# Patient Record
Sex: MALE | Race: BLACK OR AFRICAN AMERICAN | NOT HISPANIC OR LATINO | URBAN - METROPOLITAN AREA
[De-identification: names, ages, dates, MRNs, and addresses within clinical notes are randomized per-mention and may not be internally consistent; named-entity substitution may affect disease eponyms.]

---

## 2021-06-09 ENCOUNTER — INPATIENT (INPATIENT)
Facility: HOSPITAL | Age: 76
LOS: 0 days | Discharge: HOME | DRG: 287 | End: 2021-06-09
Attending: INTERNAL MEDICINE | Admitting: INTERNAL MEDICINE
Payer: MEDICARE

## 2021-06-09 VITALS
DIASTOLIC BLOOD PRESSURE: 70 MMHG | TEMPERATURE: 98 F | HEART RATE: 68 BPM | WEIGHT: 134.92 LBS | SYSTOLIC BLOOD PRESSURE: 156 MMHG | HEIGHT: 66 IN | RESPIRATION RATE: 18 BRPM | OXYGEN SATURATION: 96 %

## 2021-06-09 VITALS — WEIGHT: 134.92 LBS | HEIGHT: 65.98 IN

## 2021-06-09 DIAGNOSIS — E11.9 TYPE 2 DIABETES MELLITUS WITHOUT COMPLICATIONS: ICD-10-CM

## 2021-06-09 DIAGNOSIS — I20.0 UNSTABLE ANGINA: ICD-10-CM

## 2021-06-09 DIAGNOSIS — I10 ESSENTIAL (PRIMARY) HYPERTENSION: ICD-10-CM

## 2021-06-09 LAB
A1C WITH ESTIMATED AVERAGE GLUCOSE RESULT: 6.7 % — HIGH (ref 4–5.6)
ALBUMIN SERPL ELPH-MCNC: 4.8 G/DL — SIGNIFICANT CHANGE UP (ref 3.3–5)
ALP SERPL-CCNC: 65 U/L — SIGNIFICANT CHANGE UP (ref 40–120)
ALT FLD-CCNC: 21 U/L — SIGNIFICANT CHANGE UP (ref 10–45)
ANION GAP SERPL CALC-SCNC: 13 MMOL/L — SIGNIFICANT CHANGE UP (ref 5–17)
APTT BLD: 29.5 SEC — SIGNIFICANT CHANGE UP (ref 27.5–35.5)
AST SERPL-CCNC: 20 U/L — SIGNIFICANT CHANGE UP (ref 10–40)
BASOPHILS # BLD AUTO: 0.08 K/UL — SIGNIFICANT CHANGE UP (ref 0–0.2)
BASOPHILS NFR BLD AUTO: 1.3 % — SIGNIFICANT CHANGE UP (ref 0–2)
BILIRUB SERPL-MCNC: 0.4 MG/DL — SIGNIFICANT CHANGE UP (ref 0.2–1.2)
BUN SERPL-MCNC: 18 MG/DL — SIGNIFICANT CHANGE UP (ref 7–23)
CALCIUM SERPL-MCNC: 10.2 MG/DL — SIGNIFICANT CHANGE UP (ref 8.4–10.5)
CHLORIDE SERPL-SCNC: 103 MMOL/L — SIGNIFICANT CHANGE UP (ref 96–108)
CO2 SERPL-SCNC: 28 MMOL/L — SIGNIFICANT CHANGE UP (ref 22–31)
CREAT SERPL-MCNC: 1.02 MG/DL — SIGNIFICANT CHANGE UP (ref 0.5–1.3)
EOSINOPHIL # BLD AUTO: 0.18 K/UL — SIGNIFICANT CHANGE UP (ref 0–0.5)
EOSINOPHIL NFR BLD AUTO: 3 % — SIGNIFICANT CHANGE UP (ref 0–6)
ESTIMATED AVERAGE GLUCOSE: 146 MG/DL — HIGH (ref 68–114)
GLUCOSE BLDC GLUCOMTR-MCNC: 110 MG/DL — HIGH (ref 70–99)
GLUCOSE BLDC GLUCOMTR-MCNC: 123 MG/DL — HIGH (ref 70–99)
GLUCOSE SERPL-MCNC: 118 MG/DL — HIGH (ref 70–99)
HCT VFR BLD CALC: 43.2 % — SIGNIFICANT CHANGE UP (ref 39–50)
HGB BLD-MCNC: 13.7 G/DL — SIGNIFICANT CHANGE UP (ref 13–17)
IMM GRANULOCYTES NFR BLD AUTO: 0.2 % — SIGNIFICANT CHANGE UP (ref 0–1.5)
INR BLD: 1.06 — SIGNIFICANT CHANGE UP (ref 0.88–1.16)
LYMPHOCYTES # BLD AUTO: 3.34 K/UL — HIGH (ref 1–3.3)
LYMPHOCYTES # BLD AUTO: 55.4 % — HIGH (ref 13–44)
MAGNESIUM SERPL-MCNC: 2 MG/DL — SIGNIFICANT CHANGE UP (ref 1.6–2.6)
MCHC RBC-ENTMCNC: 26.4 PG — LOW (ref 27–34)
MCHC RBC-ENTMCNC: 31.7 GM/DL — LOW (ref 32–36)
MCV RBC AUTO: 83.2 FL — SIGNIFICANT CHANGE UP (ref 80–100)
MONOCYTES # BLD AUTO: 0.53 K/UL — SIGNIFICANT CHANGE UP (ref 0–0.9)
MONOCYTES NFR BLD AUTO: 8.8 % — SIGNIFICANT CHANGE UP (ref 2–14)
NEUTROPHILS # BLD AUTO: 1.89 K/UL — SIGNIFICANT CHANGE UP (ref 1.8–7.4)
NEUTROPHILS NFR BLD AUTO: 31.3 % — LOW (ref 43–77)
NRBC # BLD: 0 /100 WBCS — SIGNIFICANT CHANGE UP (ref 0–0)
NT-PROBNP SERPL-SCNC: 87 PG/ML — SIGNIFICANT CHANGE UP (ref 0–300)
PLATELET # BLD AUTO: 264 K/UL — SIGNIFICANT CHANGE UP (ref 150–400)
POTASSIUM SERPL-MCNC: 4 MMOL/L — SIGNIFICANT CHANGE UP (ref 3.5–5.3)
POTASSIUM SERPL-SCNC: 4 MMOL/L — SIGNIFICANT CHANGE UP (ref 3.5–5.3)
PROT SERPL-MCNC: 8.4 G/DL — HIGH (ref 6–8.3)
PROTHROM AB SERPL-ACNC: 12.7 SEC — SIGNIFICANT CHANGE UP (ref 10.6–13.6)
RBC # BLD: 5.19 M/UL — SIGNIFICANT CHANGE UP (ref 4.2–5.8)
RBC # FLD: 13.2 % — SIGNIFICANT CHANGE UP (ref 10.3–14.5)
SARS-COV-2 RNA SPEC QL NAA+PROBE: NEGATIVE — SIGNIFICANT CHANGE UP
SODIUM SERPL-SCNC: 144 MMOL/L — SIGNIFICANT CHANGE UP (ref 135–145)
TROPONIN T SERPL-MCNC: <0.01 NG/ML — SIGNIFICANT CHANGE UP (ref 0–0.01)
WBC # BLD: 6.03 K/UL — SIGNIFICANT CHANGE UP (ref 3.8–10.5)
WBC # FLD AUTO: 6.03 K/UL — SIGNIFICANT CHANGE UP (ref 3.8–10.5)

## 2021-06-09 PROCEDURE — 36415 COLL VENOUS BLD VENIPUNCTURE: CPT

## 2021-06-09 PROCEDURE — 83735 ASSAY OF MAGNESIUM: CPT

## 2021-06-09 PROCEDURE — 99152 MOD SED SAME PHYS/QHP 5/>YRS: CPT

## 2021-06-09 PROCEDURE — 85610 PROTHROMBIN TIME: CPT

## 2021-06-09 PROCEDURE — C1769: CPT

## 2021-06-09 PROCEDURE — C1894: CPT

## 2021-06-09 PROCEDURE — 84484 ASSAY OF TROPONIN QUANT: CPT

## 2021-06-09 PROCEDURE — 82962 GLUCOSE BLOOD TEST: CPT

## 2021-06-09 PROCEDURE — C1887: CPT

## 2021-06-09 PROCEDURE — 71045 X-RAY EXAM CHEST 1 VIEW: CPT | Mod: 26

## 2021-06-09 PROCEDURE — 85730 THROMBOPLASTIN TIME PARTIAL: CPT

## 2021-06-09 PROCEDURE — 93005 ELECTROCARDIOGRAM TRACING: CPT

## 2021-06-09 PROCEDURE — 71045 X-RAY EXAM CHEST 1 VIEW: CPT

## 2021-06-09 PROCEDURE — 83036 HEMOGLOBIN GLYCOSYLATED A1C: CPT

## 2021-06-09 PROCEDURE — 85025 COMPLETE CBC W/AUTO DIFF WBC: CPT

## 2021-06-09 PROCEDURE — 93010 ELECTROCARDIOGRAM REPORT: CPT

## 2021-06-09 PROCEDURE — 93458 L HRT ARTERY/VENTRICLE ANGIO: CPT | Mod: 26

## 2021-06-09 PROCEDURE — 80053 COMPREHEN METABOLIC PANEL: CPT

## 2021-06-09 PROCEDURE — 99285 EMERGENCY DEPT VISIT HI MDM: CPT | Mod: 25

## 2021-06-09 PROCEDURE — 87635 SARS-COV-2 COVID-19 AMP PRB: CPT

## 2021-06-09 PROCEDURE — 99285 EMERGENCY DEPT VISIT HI MDM: CPT | Mod: CS

## 2021-06-09 PROCEDURE — 83880 ASSAY OF NATRIURETIC PEPTIDE: CPT

## 2021-06-09 RX ORDER — VALSARTAN 80 MG/1
80 TABLET ORAL EVERY 24 HOURS
Refills: 0 | Status: DISCONTINUED | OUTPATIENT
Start: 2021-06-10 | End: 2021-06-09

## 2021-06-09 RX ORDER — DEXTROSE 50 % IN WATER 50 %
25 SYRINGE (ML) INTRAVENOUS ONCE
Refills: 0 | Status: DISCONTINUED | OUTPATIENT
Start: 2021-06-09 | End: 2021-06-09

## 2021-06-09 RX ORDER — ATENOLOL 25 MG/1
1 TABLET ORAL
Qty: 0 | Refills: 0 | DISCHARGE

## 2021-06-09 RX ORDER — INSULIN LISPRO 100/ML
VIAL (ML) SUBCUTANEOUS
Refills: 0 | Status: DISCONTINUED | OUTPATIENT
Start: 2021-06-09 | End: 2021-06-09

## 2021-06-09 RX ORDER — AMLODIPINE BESYLATE 2.5 MG/1
5 TABLET ORAL EVERY 24 HOURS
Refills: 0 | Status: DISCONTINUED | OUTPATIENT
Start: 2021-06-10 | End: 2021-06-09

## 2021-06-09 RX ORDER — CLOPIDOGREL BISULFATE 75 MG/1
600 TABLET, FILM COATED ORAL ONCE
Refills: 0 | Status: COMPLETED | OUTPATIENT
Start: 2021-06-09 | End: 2021-06-09

## 2021-06-09 RX ORDER — CHLORTHALIDONE 50 MG
0.5 TABLET ORAL
Qty: 0 | Refills: 0 | DISCHARGE

## 2021-06-09 RX ORDER — ATENOLOL 25 MG/1
0.5 TABLET ORAL
Qty: 0 | Refills: 0 | DISCHARGE

## 2021-06-09 RX ORDER — SODIUM CHLORIDE 9 MG/ML
1000 INJECTION INTRAMUSCULAR; INTRAVENOUS; SUBCUTANEOUS
Refills: 0 | Status: DISCONTINUED | OUTPATIENT
Start: 2021-06-09 | End: 2021-06-09

## 2021-06-09 RX ORDER — DEXTROSE 50 % IN WATER 50 %
12.5 SYRINGE (ML) INTRAVENOUS ONCE
Refills: 0 | Status: DISCONTINUED | OUTPATIENT
Start: 2021-06-09 | End: 2021-06-09

## 2021-06-09 RX ORDER — DEXTROSE 50 % IN WATER 50 %
15 SYRINGE (ML) INTRAVENOUS ONCE
Refills: 0 | Status: DISCONTINUED | OUTPATIENT
Start: 2021-06-09 | End: 2021-06-09

## 2021-06-09 RX ORDER — GLUCAGON INJECTION, SOLUTION 0.5 MG/.1ML
1 INJECTION, SOLUTION SUBCUTANEOUS ONCE
Refills: 0 | Status: DISCONTINUED | OUTPATIENT
Start: 2021-06-09 | End: 2021-06-09

## 2021-06-09 RX ORDER — AMLODIPINE AND VALSARTAN 5; 320 MG/1; MG/1
0.5 TABLET, FILM COATED ORAL
Qty: 0 | Refills: 0 | DISCHARGE

## 2021-06-09 RX ORDER — SODIUM CHLORIDE 9 MG/ML
1000 INJECTION, SOLUTION INTRAVENOUS
Refills: 0 | Status: DISCONTINUED | OUTPATIENT
Start: 2021-06-09 | End: 2021-06-09

## 2021-06-09 RX ORDER — ASPIRIN/CALCIUM CARB/MAGNESIUM 324 MG
324 TABLET ORAL ONCE
Refills: 0 | Status: COMPLETED | OUTPATIENT
Start: 2021-06-09 | End: 2021-06-09

## 2021-06-09 RX ORDER — ATENOLOL 25 MG/1
50 TABLET ORAL DAILY
Refills: 0 | Status: DISCONTINUED | OUTPATIENT
Start: 2021-06-09 | End: 2021-06-09

## 2021-06-09 RX ORDER — CHLORTHALIDONE 50 MG
1 TABLET ORAL
Qty: 0 | Refills: 0 | DISCHARGE

## 2021-06-09 RX ORDER — METFORMIN HYDROCHLORIDE 850 MG/1
1 TABLET ORAL
Qty: 0 | Refills: 0 | DISCHARGE

## 2021-06-09 RX ORDER — SODIUM CHLORIDE 9 MG/ML
500 INJECTION INTRAMUSCULAR; INTRAVENOUS; SUBCUTANEOUS
Refills: 0 | Status: DISCONTINUED | OUTPATIENT
Start: 2021-06-09 | End: 2021-06-09

## 2021-06-09 RX ORDER — AMLODIPINE AND VALSARTAN 5; 320 MG/1; MG/1
0 TABLET, FILM COATED ORAL
Qty: 0 | Refills: 0 | DISCHARGE

## 2021-06-09 RX ADMIN — Medication 324 MILLIGRAM(S): at 07:58

## 2021-06-09 RX ADMIN — CLOPIDOGREL BISULFATE 600 MILLIGRAM(S): 75 TABLET, FILM COATED ORAL at 07:57

## 2021-06-09 NOTE — H&P ADULT - HISTORY OF PRESENT ILLNESS
76 y/o male w/ PMHx HTN and DM who presented to [facility] ED c/o recent intermittent, substernal chest pain independent of activity. Patient reportedly had a recent outpatient stress test w/ his outpatient cardiologist, Dr. Chavarria, that was abnormal and was told to come to the ED for a cardiac catheterization. Patient denied any dizziness, syncope, SOB/KIM, abdominal pain, nausea/vomiting, melena, LE edema, fever/chills/cough, and known sick contacts. Of note, patient has lalready received two doses of the COVID-19 Pfizer vaccine.     In ED, VS initially showed temp 97.9 F, HR 68 bpm, /70, 18 RR, and SPO2 96% on room air. EKG showed __________. Labs on arrival significant for trop negative x 1, BNP normal, and COVID-PCR negative. CXR clear and unremarkable. Patient remained chest pain free while in ED. Patient was given Aspirin 324 mg PO once and Plavix 600 mg PO once. Patient now admitted to cardiology/telemetry for chest pain w/ plan for cardiac catheterization w/ Dr. Chavarria.  74 y/o male w/ PMHx HTN and DM who presented to [facility] ED c/o recent intermittent, substernal chest pain independent of activity and non-radiating. Patient reportedly had a recent outpatient stress test w/ his outpatient cardiologist, Dr. Chavarria, that was abnormal and was told to come to the ED for a cardiac catheterization. Patient denied any dizziness, syncope, SOB/KIM, abdominal pain, nausea/vomiting, melena, LE edema, fever/chills/cough, and known sick contacts. Of note, patient has lalready received two doses of the COVID-19 Pfizer vaccine (last dose 04/2021).     In ED, VS initially showed temp 97.9 F, HR 68 bpm, /70, 18 RR, and SPO2 96% on room air. EKG showed NSR at 62 bpm w/o acute ischemic changes. Labs on arrival significant for trop negative x 1, BNP normal, and COVID-PCR negative. CXR clear and unremarkable. Patient remained chest pain free while in ED. Patient was given Aspirin 324 mg PO once and Plavix 600 mg PO once. Patient now admitted to cardiology/telemetry for chest pain w/ plan for cardiac catheterization w/ Dr. Chavarria.

## 2021-06-09 NOTE — DISCHARGE NOTE NURSING/CASE MANAGEMENT/SOCIAL WORK - PATIENT PORTAL LINK FT
You can access the FollowMyHealth Patient Portal offered by [facility] by registering at the following website: [email or web address]. By joining [employer]’s FollowMyHealth portal, you will also be able to view your health information using other applications (apps) compatible with our system.

## 2021-06-09 NOTE — DISCHARGE NOTE PROVIDER - NSDCCPCAREPLAN_GEN_ALL_CORE_FT
PRINCIPAL DISCHARGE DIAGNOSIS  Diagnosis: Chest pain  Assessment and Plan of Treatment: You presented to the hospital with complaints of chest pain and subsequently had a cardiac catheterization procedure done to rule out coronary artery disease. It was discovered that you have no blockages in your heart. Chest pain can be explained by a number of reasons including stress, acid reflux, and other sources. Please continue to control your risk factors, take your medications as they are prescribed, and follow up with your outpatient providers for continued management.      SECONDARY DISCHARGE DIAGNOSES  Diagnosis: Hypertension  Assessment and Plan of Treatment: Please continue your home medications to control your blood pressure as they were previously prescribed to you.    Diagnosis: Type II diabetes mellitus  Assessment and Plan of Treatment: Please do not take your home medication of Metformin for the next two days. You may restart this medication as it was previoulsy prescribed to you on 06/12/2021.

## 2021-06-09 NOTE — ED PROVIDER NOTE - PROGRESS NOTE DETAILS
Dr. Chavarria, patient's cardiologist, came to the ED, patient is already on cath lab schedule today for cardiac catheterization.

## 2021-06-09 NOTE — ED ADULT TRIAGE NOTE - SOURCE OF INFORMATION
Patient is resting in bed, awake and quiet. Room air. Side rails are up x2. Fall precautions are in place. Bed is in lowest position. Call light is in reach. Patient denies needs at present. Will continue to monitor patient per unit protocols.  Electronically signed by Adam Tesfaye RN on 2/20/2020 at 4:46 PM Patient

## 2021-06-09 NOTE — ED ADULT NURSE NOTE - OBJECTIVE STATEMENT
75y Male A&OX3 c/o intermittent Midsternal chest pain sine Monday. Pt in to the ed today "because my cardiologist tole me to come in to the ed to get checked out". Reports seeing a cardiologist for hypertension. Denies sob, fever, chills, n/v, cough, night sweats, nor past hx of cardiac procedure, nor stents, nor blood clots". Pt received 2 doses of Pfizer covid voax

## 2021-06-09 NOTE — ED PROVIDER NOTE - CLINICAL SUMMARY MEDICAL DECISION MAKING FREE TEXT BOX
76 y/o M PMHx HTN, NIDDM presents today with intermittent chest pain for 3 days. Patient reports having an abnormal outpatient stress exam a few days ago with his cardiologist Dr. Chavarria. Patient denies any active chest pain. Patient is afebrile, vitals are normal, EKG shows normal sinus rhythm and non-specific ST/T changes, CXR shows no acute disease. Labs have been noted/troponin normal. Patient has been given aspirin and Plavix and has been admitted to cardiology for cardiac catheterization. 76 y/o M PMHx HTN, NIDDM presents today with intermittent chest pain for 3 days. Patient reports having an abnormal outpatient stress exam a few days ago with his cardiologist Dr. Chavarria. Patient denies any active chest pain. Patient is afebrile, vitals are normal, EKG shows normal sinus rhythm and non-specific ST/T changes, CXR shows no acute disease. Labs have been noted/troponin normal. Patient given aspirin and Plavix load and admitted to cardiology for cardiac catheterization.

## 2021-06-09 NOTE — H&P ADULT - PROBLEM SELECTOR PLAN 3
Home med: Metformin 500 mg BID  - holding home oral meds   - ISS ordered   - A1c/Lipid panel in AM if patient remains admitted overnight         VTE PPX: holding for procedure   Dispo: pending cardiac cath

## 2021-06-09 NOTE — ED PROVIDER NOTE - OBJECTIVE STATEMENT
74 y/o M PMHx HTN, NIDDM presents today with intermittent chest pain ongoing for 3 days. Patient notes uncomfortable feeling in center of chest, non-radiating, with no exacerbating or alleviating factors. Patient reports that he went to see his cardiologist, Dr. Chavarria, this week where he had an abnormal treadmill stress test. Patient was sent to the ED today by Dr. Chavarria for cardiac catheterization. Upon arrival he denies chest pain, SOB, fevers, nausea, chills, vomiting, diarrhea, hx of CAD, hx of MI. Patient is fully vaccinated for COVID as of April and denies any hx of COVID infection 74 y/o M PMHx HTN, NIDDM presents today with intermittent chest pain ongoing for 3 days. Patient notes uncomfortable feeling in center of chest, non-radiating, with no exacerbating or alleviating factors. Patient reports that he went to see his cardiologist, Dr. Chavarria, this week where he had an abnormal treadmill stress test. Patient was sent to the ED today by Dr. Chavarria for cardiac catheterization. Upon arrival he denies chest pain, SOB, fevers, nausea, chills, vomiting, diarrhea, hx of CAD, hx of MI. Patient is fully vaccinated for COVID  19 as of April and denies any hx of COVID infection.

## 2021-06-09 NOTE — H&P ADULT - PROBLEM SELECTOR PLAN 1
Presented w/ non-radiating chest pain, Presented w/ non-radiating, substernal chest pain independent of exertion   - reportedly had an abnormal outpatient stress test   - trop negative x 1 in ED   - EKG nonischemic on arrival   - given Aspirin 324 mg PO once and Plavix 600 mg PO once in ED   - consent obtained for cardiac catheterization w/ Dr. Chavarria on 06/09/2021   - cath lab ordered placed  - f/u cardiac cath results     ASA: III        Mallampati: III             CCS: III    Sedation Plan: Moderate   Patient Is Suitable Candidate For Sedation: Yes      Risks & benefits of procedure and sedation and risks and benefits for the alternative therapy have been explained to the patient in layman’s terms including but not limited to: allergic reaction, bleeding, infection, arrhythmia, respiratory compromise, renal and vascular compromise, limb damage, MI, CVA, emergent CABG/Vascular Surgery and death. Informed consent obtained and in chart.

## 2021-06-09 NOTE — DISCHARGE NOTE PROVIDER - CARE PROVIDER_API CALL
Juvenal Chavarria)  Cardiovascular Disease; Interventional Cardiology  [address] [place] [address]  Phone: [phone]  Fax: [phone]  Follow Up Time: 2 weeks

## 2021-06-09 NOTE — ED ADULT NURSE NOTE - NSIMPLEMENTINTERV_GEN_ALL_ED
Implemented All Universal Safety Interventions:  [place] to call system. Call bell, personal items and telephone within reach. Instruct patient to call for assistance. Room bathroom lighting operational. Non-slip footwear when patient is off stretcher. Physically safe environment: no spills, clutter or unnecessary equipment. Stretcher in lowest position, wheels locked, appropriate side rails in place.

## 2021-06-09 NOTE — DISCHARGE NOTE PROVIDER - HOSPITAL COURSE
76 y/o male w/ PMHx HTN and DM who presented to [facility] ED c/o recent intermittent, substernal chest pain independent of activity and non-radiating. Patient reportedly had a recent outpatient stress test w/ his outpatient cardiologist, Dr. Chavarria, that was abnormal and was told to come to the ED for a cardiac catheterization. Patient denied any dizziness, syncope, SOB/KIM, abdominal pain, nausea/vomiting, melena, LE edema, fever/chills/cough, and known sick contacts. Of note, patient has lalready received two doses of the COVID-19 Pfizer vaccine (last dose 04/2021). In ED, VS initially showed temp 97.9 F, HR 68 bpm, /70, 18 RR, and SPO2 96% on room air. EKG showed NSR at 62 bpm w/o acute ischemic changes. Labs on arrival significant for trop negative x 1, BNP normal, and COVID-PCR negative. CXR clear and unremarkable. Patient remained chest pain free while in ED. Patient was given Aspirin 324 mg PO once and Plavix 600 mg PO once. Patient now admitted to cardiology/telemetry for chest pain w/ plan for cardiac catheterization w/ Dr. Chavarria.     Patient is now s/p diagnostic cardiac catheterization w/ findings of RCA dominant and normal, mid LAD bridge w/o stenosis, LCx/OM normal, EDP 7 mmHg, no AS, and no LV gram performed. Right radial access was utilized and radial band was eventually removed appropriately and w/o complications. Patient remained hemodynamically stable while he remained in the cath lab holding area. Patient was given appropriate discharge instructions including medication regimen, access site management, and follow up. Any prescriptions the patient requires refills on have been e-prescribed to patient's preferred pharmacy.      VS Stable   Gen: NAD, A&O x3  Cards: RRR, clear S1 and S2 without murmur  Pulm: CTA B/L without w/r/r  Right Radial: No hematoma or ooze, peripheral pulses 2+ B/L  Abd: soft, NT  Ext: no LE edema or ulcerations B/L

## 2021-06-09 NOTE — H&P ADULT - PROBLEM SELECTOR PLAN 2
's to 160's on arrival to ED   - home medications: Atenolol 50 mg daily, Amlodipine-Valsartan 5 mg-80 mg daily, Chlorthalidone 12.5 mg daily   - continue Atenolol 50 mg daily   - restart Amlodipine-Valsartan and Chlorthalidone following cardiac cath (due to dye load)

## 2021-06-09 NOTE — ED CLERICAL - NS ED CLERK NOTE PRE-ARRIVAL INFORMATION; ADDITIONAL PRE-ARRIVAL INFORMATION
PT COMING IN DUE TO ABNORMAL STRESS TEST. UNSTABLE ANGINA. NEEDS A CATH. PLEASE CALL DR ESPINOZA [phone]

## 2021-06-09 NOTE — DISCHARGE NOTE PROVIDER - NSDCFUADDINST_GEN_ALL_CORE_FT
The catheter from your wrist was removed and bleeding was stopped by manual pressure.  Wash the site daily with soap and water.  There is no need to put on a bandage.      Call the Interventional Cardiology and Vascular Team at [phone] or [phone] if any of following occur pertaining to your vascular access site: Bleeding or hematoma formation (collection of blood under the skin), drainage or redness at the puncture site, numbness, decrease in strength, coolness or pale coloration of skin of the leg or hand.

## 2021-06-09 NOTE — H&P ADULT - ASSESSMENT
76 y/o male w/ PMHx HTN and DM who presented to [facility] ED c/o recent intermittent, substernal chest pain independent of activity and non-radiating. Patient reportedly had a recent outpatient stress test w/ his outpatient cardiologist, Dr. Chavarria, that was abnormal and was told to come to the ED for a cardiac catheterization. Patient denied any dizziness, syncope, SOB/KIM, abdominal pain, nausea/vomiting, melena, LE edema, fever/chills/cough, and known sick contacts. Of note, patient has lalready received two doses of the COVID-19 Pfizer vaccine (last dose 04/2021). In ED, VS initially showed temp 97.9 F, HR 68 bpm, /70, 18 RR, and SPO2 96% on room air. EKG showed NSR at 62 bpm w/o acute ischemic changes. Labs on arrival significant for trop negative x 1, BNP normal, and COVID-PCR negative. CXR clear and unremarkable. Patient remained chest pain free while in ED. Patient was given Aspirin 324 mg PO once and Plavix 600 mg PO once. Patient now admitted to cardiology/telemetry for chest pain w/ plan for cardiac catheterization w/ Dr. Chavarria.  76 y/o male w/ PMHx HTN and DM who presented to [facility] ED c/o recent intermittent, substernal chest pain independent of activity and non-radiating. Patient reportedly had a recent outpatient stress test w/ his outpatient cardiologist, Dr. Chavarria, that was abnormal and was told to come to the ED for a cardiac catheterization. Patient denied any dizziness, syncope, SOB/KIM, abdominal pain, nausea/vomiting, melena, LE edema, fever/chills/cough, and known sick contacts. Of note, patient has lalready received two doses of the COVID-19 Pfizer vaccine (last dose 04/2021). In ED, VS initially showed temp 97.9 F, HR 68 bpm, /70, 18 RR, and SPO2 96% on room air. EKG showed NSR at 62 bpm w/o acute ischemic changes. Labs on arrival significant for trop negative x 1, BNP normal, and COVID-PCR negative. CXR clear and unremarkable. Patient remained chest pain free while in ED. Patient was given Aspirin 324 mg PO once and Plavix 600 mg PO once. Patient now admitted to cardiology/telemetry for chest pain w/ plan for cardiac catheterization w/ Dr. Chavarria.

## 2021-06-09 NOTE — DISCHARGE NOTE PROVIDER - NSDCMRMEDTOKEN_GEN_ALL_CORE_FT
amlodipine-valsartan 10 mg-160 mg oral tablet: 0.5 tab(s) orally once a day  atenolol 100 mg oral tablet: 0.5 tab(s) orally once a day  chlorthalidone 25 mg oral tablet: 0.5 tab(s) orally once a day  metFORMIN 500 mg oral tablet: 1 tab(s) orally 2 times a day

## 2021-06-12 RX ORDER — METFORMIN HYDROCHLORIDE 850 MG/1
1 TABLET ORAL
Qty: 0 | Refills: 0 | DISCHARGE
Start: 2021-06-12

## 2021-06-17 DIAGNOSIS — R94.39 ABNORMAL RESULT OF OTHER CARDIOVASCULAR FUNCTION STUDY: ICD-10-CM

## 2021-06-17 DIAGNOSIS — Z79.84 LONG TERM (CURRENT) USE OF ORAL HYPOGLYCEMIC DRUGS: ICD-10-CM

## 2021-06-17 DIAGNOSIS — I10 ESSENTIAL (PRIMARY) HYPERTENSION: ICD-10-CM

## 2021-06-17 DIAGNOSIS — E78.5 HYPERLIPIDEMIA, UNSPECIFIED: ICD-10-CM

## 2021-06-17 DIAGNOSIS — R07.9 CHEST PAIN, UNSPECIFIED: ICD-10-CM

## 2021-06-17 DIAGNOSIS — E11.9 TYPE 2 DIABETES MELLITUS WITHOUT COMPLICATIONS: ICD-10-CM

## 2025-01-28 NOTE — DISCHARGE NOTE PROVIDER - NS AS DC PROVIDER CONTACT Y/N MULTI
PLAN:  You can STOP Eliquis at this time  Continue Aspirin 81mg daily  Complete 7 day holter monitor  Return to clinic in 6 months with an echo  August 5th, 2025 2pm echo; 3pm appointment    >> Call  option 1 and ask for Debra (Admin), Akosua RN, Bekah RN, Stella RN or Yenifer RN  Message Dr Allen and the Adult Congenital Cardiology team in the [employer] cayla     > Calls and messages are triaged based on urgency Monday-Friday 8:00am-4:30pm.   > If you call outside of business hours or on weekends, please ask for the On-Call [facility] Provider    Fax: [phone] Attn: Dr Allen / [facility]    For any requests for paperwork to be completed by our office, please allow 7-10 business days for completion.  For all medical records requests: [email or web address]  Clinic notes can be printed from the [employer] portal: [email or web address]  
Yes